# Patient Record
Sex: FEMALE | Race: OTHER | NOT HISPANIC OR LATINO | URBAN - NONMETROPOLITAN AREA
[De-identification: names, ages, dates, MRNs, and addresses within clinical notes are randomized per-mention and may not be internally consistent; named-entity substitution may affect disease eponyms.]

---

## 2020-09-23 ENCOUNTER — OFFICE VISIT (OUTPATIENT)
Dept: URBAN - NONMETROPOLITAN AREA CLINIC 2 | Facility: CLINIC | Age: 75
End: 2020-09-23

## 2020-11-02 ENCOUNTER — OFFICE VISIT (OUTPATIENT)
Dept: URBAN - METROPOLITAN AREA TELEHEALTH 2 | Facility: TELEHEALTH | Age: 75
End: 2020-11-02

## 2020-12-21 ENCOUNTER — TELEPHONE ENCOUNTER (OUTPATIENT)
Dept: URBAN - METROPOLITAN AREA CLINIC 23 | Facility: CLINIC | Age: 75
End: 2020-12-21

## 2021-02-25 ENCOUNTER — LAB OUTSIDE AN ENCOUNTER (OUTPATIENT)
Dept: URBAN - NONMETROPOLITAN AREA CLINIC 13 | Facility: CLINIC | Age: 76
End: 2021-02-25

## 2021-02-25 ENCOUNTER — OFFICE VISIT (OUTPATIENT)
Dept: URBAN - NONMETROPOLITAN AREA CLINIC 13 | Facility: CLINIC | Age: 76
End: 2021-02-25
Payer: MEDICARE

## 2021-02-25 DIAGNOSIS — Z86.010 PERSONAL HISTORY OF COLONIC POLYPS: ICD-10-CM

## 2021-02-25 DIAGNOSIS — K59.04 CHRONIC IDIOPATHIC CONSTIPATION: ICD-10-CM

## 2021-02-25 DIAGNOSIS — K31.84 GASTROPARESIS: ICD-10-CM

## 2021-02-25 PROCEDURE — 99214 OFFICE O/P EST MOD 30 MIN: CPT | Performed by: INTERNAL MEDICINE

## 2021-02-25 NOTE — HPI-TODAY'S VISIT:
Patient returns with complaints of "gastroparesis".  She had an episode of epigastric and substernal burning associated with numbness and tingling in her right arm as well as difficulty with hearing and speech last August.  She was evaluated in the ER and no cause found.  She was seen by a cardiologist and was felt not to have any significant heart disease.  Is unclear whether she was seen by neurology. She states that, after the above, she then developed symptoms of gastroparesis.  These include poor appetite early satiety and heartburn.  She states that she has a very poor appetite.  She does not eat after 2 PM because if she does she will regurgitate and it makes her asthma worse.  She denies belching.  She has no nausea or vomiting.  She has no abdominal pain.  Review of her medications shows no medicine that would cause gastroparesis.  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 Her sx resolved and she has not taken it for several yrs.  She does have daily heartburn for which she takes apple cider vinegar.  She is not on any medication.  She denies dysphagia. She is chronically constipated.  She has a bowel movement about every 3 to 4 days.  She does take some fiber and stool softeners.  Stool is not hard.  She does have to strain.  There is no evidence of bleeding

## 2021-03-31 ENCOUNTER — TELEPHONE ENCOUNTER (OUTPATIENT)
Dept: URBAN - METROPOLITAN AREA CLINIC 92 | Facility: CLINIC | Age: 76
End: 2021-03-31

## 2021-03-31 ENCOUNTER — CLAIMS CREATED FROM THE CLAIM WINDOW (OUTPATIENT)
Dept: URBAN - METROPOLITAN AREA CLINIC 4 | Facility: CLINIC | Age: 76
End: 2021-03-31
Payer: MEDICARE

## 2021-03-31 ENCOUNTER — OFFICE VISIT (OUTPATIENT)
Dept: URBAN - NONMETROPOLITAN AREA SURGERY CENTER 1 | Facility: SURGERY CENTER | Age: 76
End: 2021-03-31
Payer: MEDICARE

## 2021-03-31 ENCOUNTER — LAB OUTSIDE AN ENCOUNTER (OUTPATIENT)
Dept: URBAN - METROPOLITAN AREA CLINIC 92 | Facility: CLINIC | Age: 76
End: 2021-03-31

## 2021-03-31 DIAGNOSIS — K29.60 OTHER GASTRITIS WITHOUT BLEEDING: ICD-10-CM

## 2021-03-31 DIAGNOSIS — K31.89 ACQUIRED DEFORMITY OF DUODENUM: ICD-10-CM

## 2021-03-31 DIAGNOSIS — K31.89 ISOLATED IDIOPATHIC GRANULOMA OF STOMACH: ICD-10-CM

## 2021-03-31 PROCEDURE — 88305 TISSUE EXAM BY PATHOLOGIST: CPT | Performed by: PATHOLOGY

## 2021-03-31 PROCEDURE — G8907 PT DOC NO EVENTS ON DISCHARG: HCPCS | Performed by: INTERNAL MEDICINE

## 2021-03-31 PROCEDURE — 88341 IMHCHEM/IMCYTCHM EA ADD ANTB: CPT | Performed by: PATHOLOGY

## 2021-03-31 PROCEDURE — 43239 EGD BIOPSY SINGLE/MULTIPLE: CPT | Performed by: INTERNAL MEDICINE

## 2021-03-31 PROCEDURE — 88342 IMHCHEM/IMCYTCHM 1ST ANTB: CPT | Performed by: PATHOLOGY

## 2021-03-31 PROCEDURE — 88312 SPECIAL STAINS GROUP 1: CPT | Performed by: PATHOLOGY

## 2021-04-05 ENCOUNTER — TELEPHONE ENCOUNTER (OUTPATIENT)
Dept: URBAN - NONMETROPOLITAN AREA CLINIC 1 | Facility: CLINIC | Age: 76
End: 2021-04-05

## 2021-04-06 ENCOUNTER — OFFICE VISIT (OUTPATIENT)
Dept: URBAN - NONMETROPOLITAN AREA SURGERY CENTER 1 | Facility: SURGERY CENTER | Age: 76
End: 2021-04-06

## 2021-06-04 ENCOUNTER — OFFICE VISIT (OUTPATIENT)
Dept: URBAN - NONMETROPOLITAN AREA CLINIC 2 | Facility: CLINIC | Age: 76
End: 2021-06-04

## 2021-07-09 ENCOUNTER — WEB ENCOUNTER (OUTPATIENT)
Dept: URBAN - NONMETROPOLITAN AREA CLINIC 2 | Facility: CLINIC | Age: 76
End: 2021-07-09

## 2021-07-09 ENCOUNTER — OFFICE VISIT (OUTPATIENT)
Dept: URBAN - NONMETROPOLITAN AREA CLINIC 2 | Facility: CLINIC | Age: 76
End: 2021-07-09
Payer: MEDICARE

## 2021-07-09 DIAGNOSIS — K59.04 CHRONIC IDIOPATHIC CONSTIPATION: ICD-10-CM

## 2021-07-09 DIAGNOSIS — Z86.010 PERSONAL HISTORY OF COLONIC POLYPS: ICD-10-CM

## 2021-07-09 DIAGNOSIS — K31.84 GASTROPARESIS: ICD-10-CM

## 2021-07-09 PROCEDURE — 99214 OFFICE O/P EST MOD 30 MIN: CPT | Performed by: INTERNAL MEDICINE

## 2021-07-09 RX ORDER — LINACLOTIDE 72 UG/1
1 CAPSULE AT LEAST 30 MINUTES BEFORE THE FIRST MEAL OF THE DAY ON AN EMPTY STOMACH CAPSULE, GELATIN COATED ORAL ONCE A DAY
Refills: 3 | OUTPATIENT
Start: 2021-07-09 | End: 2021-11-06

## 2021-07-09 NOTE — HPI-TODAY'S VISIT:
Patient comes in for follow-up of questionable delayed gastric emptying.  Gastric emptying study done 421 shows mild to borderline delayed emptying.  Patient is on no medication. She denies nausea or vomiting.  She does have some mild early satiety.  She has no belching or regurgitation.  She denies abdominal pain.  Her appetite and weight are stable. She remains relatively constipated despite taking Benefiber twice a day and GlycoLax every other day.  She can go several days with no bowel movement.  When she does go it is a small amount of stool.  She rarely has an adequate bowel movement.  Stools are not hard.  There is no evidence of bleeding.  If she tries to take MiraLAX daily she develops a bloating and then episodic diarrhea.

## 2021-07-14 ENCOUNTER — DASHBOARD ENCOUNTERS (OUTPATIENT)
Age: 76
End: 2021-07-14

## 2021-07-14 PROBLEM — 82934008: Status: ACTIVE | Noted: 2021-02-25

## 2021-07-14 PROBLEM — 235675006: Status: ACTIVE | Noted: 2021-02-25

## 2021-07-14 PROBLEM — 428283002: Status: ACTIVE | Noted: 2021-02-25

## 2022-02-10 ENCOUNTER — OFFICE VISIT (OUTPATIENT)
Dept: URBAN - NONMETROPOLITAN AREA CLINIC 13 | Facility: CLINIC | Age: 77
End: 2022-02-10

## 2025-02-21 ENCOUNTER — OFFICE VISIT (OUTPATIENT)
Dept: URBAN - NONMETROPOLITAN AREA CLINIC 2 | Facility: CLINIC | Age: 80
End: 2025-02-21
Payer: MEDICARE

## 2025-02-21 ENCOUNTER — TELEPHONE ENCOUNTER (OUTPATIENT)
Dept: URBAN - NONMETROPOLITAN AREA CLINIC 13 | Facility: CLINIC | Age: 80
End: 2025-02-21

## 2025-02-21 VITALS
SYSTOLIC BLOOD PRESSURE: 143 MMHG | HEART RATE: 98 BPM | DIASTOLIC BLOOD PRESSURE: 83 MMHG | BODY MASS INDEX: 27.31 KG/M2 | WEIGHT: 160 LBS | HEIGHT: 64 IN

## 2025-02-21 DIAGNOSIS — Z86.0100 PERSONAL HISTORY OF COLONIC POLYPS: ICD-10-CM

## 2025-02-21 DIAGNOSIS — K31.84 GASTROPARESIS: ICD-10-CM

## 2025-02-21 DIAGNOSIS — K59.04 CHRONIC IDIOPATHIC CONSTIPATION: ICD-10-CM

## 2025-02-21 PROCEDURE — 99204 OFFICE O/P NEW MOD 45 MIN: CPT | Performed by: INTERNAL MEDICINE

## 2025-02-21 NOTE — HPI-TODAY'S VISIT:
7/9/21 (Dr. Torres ): Patient comes in for follow-up of questionable delayed gastric emptying.  Gastric emptying study done 421 shows mild to borderline delayed emptying.  Patient is on no medication. She denies nausea or vomiting.  She does have some mild early satiety.  She has no belching or regurgitation.  She denies abdominal pain.  Her appetite and weight are stable. She remains relatively constipated despite taking Benefiber twice a day and GlycoLax every other day.  She can go several days with no bowel movement.  When she does go it is a small amount of stool.  She rarely has an adequate bowel movement.  Stools are not hard.  There is no evidence of bleeding.  If she tries to take MiraLAX daily she develops a bloating and then episodic diarrhea.  2/21/25: Ms. Merino returns to GI clinic for follow-up of mildly delayed gastric emptying and chronic constipation.  She was last seen 7/9/21 by Dr. Torres.  Dr. Torres trialed her on Linzess 72 in 2021.  She did not like this medication.  Last colonoscopy by him was 2018 with repeat planned 2023.  She reports she was very constipated and had abdominal distention and went to the emergency room.  There she had a CT scan on January 25 showing fecal loading, distended bladder with mild hydroureter, and small bilateral pulmonary nodules.  She was given lactulose which she first vomited but eventually she started with a small dose and worked her way up and is now moving her bowels a little more effectively.  She is currently feeling better.  She is not vomiting anymore.  Her abdominal distention is a bit better.  She has seen pulmonary for her lung nodules and they have ordered a dedicated CT chest.

## 2025-02-24 ENCOUNTER — LAB OUTSIDE AN ENCOUNTER (OUTPATIENT)
Dept: URBAN - NONMETROPOLITAN AREA CLINIC 2 | Facility: CLINIC | Age: 80
End: 2025-02-24

## 2025-04-22 ENCOUNTER — OFFICE VISIT (OUTPATIENT)
Dept: URBAN - NONMETROPOLITAN AREA SURGERY CENTER 1 | Facility: SURGERY CENTER | Age: 80
End: 2025-04-22
Payer: MEDICARE

## 2025-04-22 ENCOUNTER — CLAIMS CREATED FROM THE CLAIM WINDOW (OUTPATIENT)
Dept: URBAN - METROPOLITAN AREA CLINIC 4 | Facility: CLINIC | Age: 80
End: 2025-04-22
Payer: MEDICARE

## 2025-04-22 DIAGNOSIS — K31.A11 GASTRIC INTESTINAL METAPLASIA WITHOUT DYSPLASIA, INVOLVING THE ANTRUM: ICD-10-CM

## 2025-04-22 DIAGNOSIS — Z09 ENCOUNTER FOR COLONOSCOPY FOLLOWING COLON POLYP REMOVAL: ICD-10-CM

## 2025-04-22 DIAGNOSIS — Z86.0100 HISTORY OF COLON POLYPS: ICD-10-CM

## 2025-04-22 DIAGNOSIS — K29.40 ATROPHIC FUNDIC GLAND GASTRITIS: ICD-10-CM

## 2025-04-22 DIAGNOSIS — Z86.0100 PERSONAL HISTORY OF COLON POLYPS, UNSPECIFIED: ICD-10-CM

## 2025-04-22 DIAGNOSIS — K21.9 ACID REFLUX: ICD-10-CM

## 2025-04-22 DIAGNOSIS — K31.89 OTHER DISEASES OF STOMACH AND DUODENUM: ICD-10-CM

## 2025-04-22 DIAGNOSIS — K29.40 CHRONIC ATROPHIC GASTRITIS WITHOUT BLEEDING: ICD-10-CM

## 2025-04-22 DIAGNOSIS — K29.70 CHRONIC GASTRITIS: ICD-10-CM

## 2025-04-22 DIAGNOSIS — K21.9 GASTROESOPHAGEAL REFLUX DISEASE WITHOUT ESOPHAGITIS: ICD-10-CM

## 2025-04-22 DIAGNOSIS — K31.A11 INTESTINAL METAPLASIA OF ANTRUM OF STOMACH WITHOUT DYSPLASIA: ICD-10-CM

## 2025-04-22 DIAGNOSIS — K21.9 GASTRO-ESOPHAGEAL REFLUX DISEASE WITHOUT ESOPHAGITIS: ICD-10-CM

## 2025-04-22 PROCEDURE — 88305 TISSUE EXAM BY PATHOLOGIST: CPT | Performed by: PATHOLOGY

## 2025-04-22 PROCEDURE — 43239 EGD BIOPSY SINGLE/MULTIPLE: CPT | Performed by: INTERNAL MEDICINE

## 2025-04-22 PROCEDURE — G0105 COLORECTAL SCRN; HI RISK IND: HCPCS | Performed by: CLINIC/CENTER

## 2025-04-22 PROCEDURE — G0105 COLORECTAL SCRN; HI RISK IND: HCPCS | Performed by: INTERNAL MEDICINE

## 2025-04-22 PROCEDURE — 0529F INTRVL 3/>YR PTS CLNSCP DOCD: CPT | Performed by: CLINIC/CENTER

## 2025-04-22 PROCEDURE — 00813 ANES UPR LWR GI NDSC PX: CPT | Performed by: NURSE ANESTHETIST, CERTIFIED REGISTERED

## 2025-04-22 PROCEDURE — 0529F INTRVL 3/>YR PTS CLNSCP DOCD: CPT | Performed by: INTERNAL MEDICINE

## 2025-04-22 PROCEDURE — 43239 EGD BIOPSY SINGLE/MULTIPLE: CPT | Performed by: CLINIC/CENTER

## 2025-04-22 PROCEDURE — 88342 IMHCHEM/IMCYTCHM 1ST ANTB: CPT | Performed by: PATHOLOGY

## 2025-06-23 ENCOUNTER — OFFICE VISIT (OUTPATIENT)
Dept: URBAN - NONMETROPOLITAN AREA CLINIC 2 | Facility: CLINIC | Age: 80
End: 2025-06-23